# Patient Record
Sex: FEMALE | Race: WHITE | HISPANIC OR LATINO
[De-identification: names, ages, dates, MRNs, and addresses within clinical notes are randomized per-mention and may not be internally consistent; named-entity substitution may affect disease eponyms.]

---

## 2020-10-30 ENCOUNTER — APPOINTMENT (OUTPATIENT)
Dept: OTOLARYNGOLOGY | Facility: CLINIC | Age: 55
End: 2020-10-30
Payer: COMMERCIAL

## 2020-10-30 VITALS
DIASTOLIC BLOOD PRESSURE: 80 MMHG | BODY MASS INDEX: 23.22 KG/M2 | HEART RATE: 68 BPM | SYSTOLIC BLOOD PRESSURE: 127 MMHG | WEIGHT: 155 LBS | HEIGHT: 68.5 IN

## 2020-10-30 DIAGNOSIS — Z78.9 OTHER SPECIFIED HEALTH STATUS: ICD-10-CM

## 2020-10-30 DIAGNOSIS — H91.92 UNSPECIFIED HEARING LOSS, LEFT EAR: ICD-10-CM

## 2020-10-30 DIAGNOSIS — Z87.891 PERSONAL HISTORY OF NICOTINE DEPENDENCE: ICD-10-CM

## 2020-10-30 DIAGNOSIS — R09.82 POSTNASAL DRIP: ICD-10-CM

## 2020-10-30 DIAGNOSIS — J34.89 OTHER SPECIFIED DISORDERS OF NOSE AND NASAL SINUSES: ICD-10-CM

## 2020-10-30 DIAGNOSIS — Z82.2 FAMILY HISTORY OF DEAFNESS AND HEARING LOSS: ICD-10-CM

## 2020-10-30 DIAGNOSIS — Z82.49 FAMILY HISTORY OF ISCHEMIC HEART DISEASE AND OTHER DISEASES OF THE CIRCULATORY SYSTEM: ICD-10-CM

## 2020-10-30 DIAGNOSIS — Z80.9 FAMILY HISTORY OF MALIGNANT NEOPLASM, UNSPECIFIED: ICD-10-CM

## 2020-10-30 DIAGNOSIS — H93.12 TINNITUS, LEFT EAR: ICD-10-CM

## 2020-10-30 DIAGNOSIS — H92.02 OTALGIA, LEFT EAR: ICD-10-CM

## 2020-10-30 DIAGNOSIS — J34.2 DEVIATED NASAL SEPTUM: ICD-10-CM

## 2020-10-30 DIAGNOSIS — Z83.3 FAMILY HISTORY OF DIABETES MELLITUS: ICD-10-CM

## 2020-10-30 PROCEDURE — 92557 COMPREHENSIVE HEARING TEST: CPT

## 2020-10-30 PROCEDURE — 92567 TYMPANOMETRY: CPT

## 2020-10-30 PROCEDURE — 99204 OFFICE O/P NEW MOD 45 MIN: CPT | Mod: 25

## 2020-10-30 PROCEDURE — 31231 NASAL ENDOSCOPY DX: CPT

## 2020-10-30 PROCEDURE — 99072 ADDL SUPL MATRL&STAF TM PHE: CPT

## 2020-10-30 RX ORDER — OMEPRAZOLE MAGNESIUM 40 MG/1
CAPSULE, DELAYED RELEASE ORAL
Refills: 0 | Status: ACTIVE | COMMUNITY

## 2020-10-30 NOTE — REASON FOR VISIT
[Initial Evaluation] : an initial evaluation for [FreeTextEntry2] : here for sinus pain, sinus pressure, post nasal drip, left otalgia

## 2020-10-30 NOTE — HISTORY OF PRESENT ILLNESS
[de-identified] : 55 year old female here for sinus pain, sinus pressure, post nasal drip, left otalgia.  States all symptoms are left sided, started about 3 months ago.  States feels a heaviness, sinus congestion, fluid on the left side of her head.  Denies use of steroidal nasal sprays.  States usually has 1-2 sinus infections a year, treated with antibiotics.  Denies cat scans of sinuses in the past year.

## 2020-10-30 NOTE — REVIEW OF SYSTEMS
[Sneezing] : sneezing [Seasonal Allergies] : seasonal allergies [Post Nasal Drip] : post nasal drip [Ear Pain] : ear pain [Dizziness] : dizziness [Vertigo] : vertigo [Lightheadedness] : lightheadedness [Ear Noises] : ear noises [Recurrent Sinus Infections] : recurrent sinus infections [Sinus Pain] : sinus pain [Sinus Pressure] : sinus pressure [Throat Clearing] : throat clearing [Throat Pain] : throat pain [Throat Dryness] : throat dryness [Throat Itching] : throat itching [Wheezing] : wheezing [Cough] : cough [Heartburn] : heartburn [Joint Pain] : joint pain [Easy Bruising] : a tendency for easy bruising [Negative] : Endocrine [FreeTextEntry9] : muscle aches [FreeTextEntry1] : headache, sweating at night

## 2020-10-30 NOTE — PROCEDURE
[Video Captured] : video captured and filed [Image(s) Captured] : image(s) captured and filed [Unable to Cooperate with Mirror] : patient unable to cooperate with mirror [Complicated Symptoms] : complicated symptoms requiring more thorough examination than provided by mirror [Globus] : globus [Topical Lidocaine] : topical lidocaine [Oxymetazoline HCl] : oxymetazoline HCl [Flexible Endoscope] : examined with the flexible endoscope [Serial Number: ___] : Serial Number: [unfilled] [True Vocal Cords Paralysis] : no true vocal cord paralysis [True Vocal Cords Erythematous] : no true vocal cord edema [True Vocal Cords Aquino's Nodules] : no true vocal cord nodules [Glottis Arytenoid Cartilages] : no arytenoid granulomas [Glottis Arytenoid Cartilages Erythema] : no arytenoid erythema [Normal] : posterior cricoid area had healthy pink mucosa in the interarytenoid area and the esophageal inlet [Arytenoid Edema ___ /4] : bilaterally were normal [Arytenoid Erythema ___ /4] : bilaterally were normal [de-identified] : Patient was placed in the examination chair in a sitting position. The nose was decongested with oxymetazoline nasal solution. The airway was anesthetized with 4% Xylocaine.  The back of the throat was anesthetized with Cetacaine. Direct flexible/rigid video endoscopy was performed. Findings revealed:\par Patient has a large septal perforation the posterior bony septum is deviated to the right knee against the right lateral nasal wall left middle turbinate appears to be enlarged blocking portion of the drainage pathway and airway. Compensatory turbinate hypertrophy nasal pharynx clear larynx vocal cords epiglottis normal

## 2020-10-30 NOTE — PHYSICAL EXAM
[] : septum deviated to the right [Midline] : trachea located in midline position [Normal] : no rashes [de-identified] : middle turbinate on left obstructing, Large Septal Perforation.

## 2020-11-30 ENCOUNTER — OUTPATIENT (OUTPATIENT)
Dept: OUTPATIENT SERVICES | Facility: HOSPITAL | Age: 55
LOS: 1 days | End: 2020-11-30
Payer: COMMERCIAL

## 2020-11-30 ENCOUNTER — TRANSCRIPTION ENCOUNTER (OUTPATIENT)
Age: 55
End: 2020-11-30

## 2020-11-30 ENCOUNTER — APPOINTMENT (OUTPATIENT)
Dept: CT IMAGING | Facility: HOSPITAL | Age: 55
End: 2020-11-30

## 2020-11-30 DIAGNOSIS — J34.89 OTHER SPECIFIED DISORDERS OF NOSE AND NASAL SINUSES: ICD-10-CM

## 2020-11-30 DIAGNOSIS — J34.2 DEVIATED NASAL SEPTUM: ICD-10-CM

## 2020-11-30 DIAGNOSIS — Z00.00 ENCOUNTER FOR GENERAL ADULT MEDICAL EXAMINATION WITHOUT ABNORMAL FINDINGS: ICD-10-CM

## 2020-11-30 PROCEDURE — 70486 CT MAXILLOFACIAL W/O DYE: CPT

## 2020-11-30 PROCEDURE — 70486 CT MAXILLOFACIAL W/O DYE: CPT | Mod: 26

## 2020-12-08 ENCOUNTER — NON-APPOINTMENT (OUTPATIENT)
Age: 55
End: 2020-12-08

## 2020-12-09 DIAGNOSIS — J34.2 DEVIATED NASAL SEPTUM: ICD-10-CM

## 2020-12-11 RX ORDER — EUCALYPTUS/PEPPERMINT OIL
SOLUTION, NON-ORAL NASAL
Qty: 1 | Refills: 3 | Status: ACTIVE | COMMUNITY
Start: 2020-12-11 | End: 1900-01-01

## 2020-12-11 RX ORDER — MOMETASONE 50 UG/1
50 SPRAY, METERED NASAL DAILY
Qty: 1 | Refills: 5 | Status: ACTIVE | COMMUNITY
Start: 2020-12-11 | End: 1900-01-01

## 2022-03-01 ENCOUNTER — APPOINTMENT (OUTPATIENT)
Dept: ENDOCRINOLOGY | Facility: CLINIC | Age: 57
End: 2022-03-01
Payer: COMMERCIAL

## 2022-03-01 VITALS
TEMPERATURE: 97.5 F | DIASTOLIC BLOOD PRESSURE: 80 MMHG | SYSTOLIC BLOOD PRESSURE: 110 MMHG | OXYGEN SATURATION: 98 % | HEART RATE: 70 BPM | BODY MASS INDEX: 23.67 KG/M2 | HEIGHT: 68.5 IN | WEIGHT: 158 LBS

## 2022-03-01 DIAGNOSIS — Z86.39 PERSONAL HISTORY OF OTHER ENDOCRINE, NUTRITIONAL AND METABOLIC DISEASE: ICD-10-CM

## 2022-03-01 PROCEDURE — 99203 OFFICE O/P NEW LOW 30 MIN: CPT

## 2022-03-01 NOTE — ASSESSMENT
[FreeTextEntry1] : 57 year old female with history of multiple thyroid nodules here for evaluation . \par \par Thyroid nodules: \par -Sub-cm nodules, not meeting FNA critieria ( Low suspicion) \par -Will check TFTs \par -Follow up appropriate in 18 months

## 2022-03-01 NOTE — HISTORY OF PRESENT ILLNESS
[FreeTextEntry1] : 57 year old female here for eval of thyroid nodules. \par \par First discovered 2015, when it was bothering the patient. \par It was not being followed by the patient until 2021. \par She has been having some discomfort. She reported having pressure and increased dry cough. \par \par Most recent ultrasound showing 6 x 5 x 4 mm nodule on the left midpole ( Calcified nodule) \par 2mm colloid cyst in the right mid pole \par \par No family history of thyroid cancer \par No history of head or neck radiation exposure \par \par Some difficulty swallowing and occasional hoarseness. \par No choking sensation.                                                \par \par \par Father with hsitory of esophageal cancer, brother with leukemia and hodgkins lymphoma

## 2022-03-01 NOTE — PHYSICAL EXAM
[Alert] : alert [Well Nourished] : well nourished [No Acute Distress] : no acute distress [Normal Sclera/Conjunctiva] : normal sclera/conjunctiva [EOMI] : extra ocular movement intact [PERRL] : pupils equal, round and reactive to light [Normal Outer Ear/Nose] : the ears and nose were normal in appearance [Normal Hearing] : hearing was normal [Normal TMs] : both tympanic membranes were normal [No Neck Mass] : no neck mass was observed [Thyroid Not Enlarged] : the thyroid was not enlarged [No Respiratory Distress] : no respiratory distress [Clear to Auscultation] : lungs were clear to auscultation bilaterally [Normal S1, S2] : normal S1 and S2 [Normal Rate] : heart rate was normal [Regular Rhythm] : with a regular rhythm [Normal Bowel Sounds] : normal bowel sounds [Soft] : abdomen soft [Normal Gait] : normal gait [No Clubbing, Cyanosis] : no clubbing  or cyanosis of the fingernails [No Joint Swelling] : no joint swelling seen [Normal Strength/Tone] : muscle strength and tone were normal [No Rash] : no rash [No Skin Lesions] : no skin lesions [No Motor Deficits] : the motor exam was normal [Normal Reflexes] : deep tendon reflexes were 2+ and symmetric [No Tremors] : no tremors [Oriented x3] : oriented to person, place, and time [Normal Affect] : the affect was normal [Normal Insight/Judgement] : insight and judgment were intact [Normal Mood] : the mood was normal

## 2023-03-24 ENCOUNTER — APPOINTMENT (OUTPATIENT)
Dept: ENDOCRINOLOGY | Facility: CLINIC | Age: 58
End: 2023-03-24

## 2023-04-13 ENCOUNTER — OFFICE (OUTPATIENT)
Facility: LOCATION | Age: 58
Setting detail: OPHTHALMOLOGY
End: 2023-04-13
Payer: COMMERCIAL

## 2023-04-13 VITALS — WEIGHT: 150 LBS | BODY MASS INDEX: 22.73 KG/M2 | HEIGHT: 68 IN

## 2023-04-13 DIAGNOSIS — H40.013: ICD-10-CM

## 2023-04-13 DIAGNOSIS — H43.812: ICD-10-CM

## 2023-04-13 DIAGNOSIS — G43.109: ICD-10-CM

## 2023-04-13 DIAGNOSIS — H25.13: ICD-10-CM

## 2023-04-13 PROCEDURE — 92202 OPSCPY EXTND ON/MAC DRAW: CPT | Performed by: OPHTHALMOLOGY

## 2023-04-13 PROCEDURE — 92133 CPTRZD OPH DX IMG PST SGM ON: CPT | Performed by: OPHTHALMOLOGY

## 2023-04-13 PROCEDURE — 92020 GONIOSCOPY: CPT | Performed by: OPHTHALMOLOGY

## 2023-04-13 PROCEDURE — 92004 COMPRE OPH EXAM NEW PT 1/>: CPT | Performed by: OPHTHALMOLOGY

## 2023-04-13 PROCEDURE — 76514 ECHO EXAM OF EYE THICKNESS: CPT | Performed by: OPHTHALMOLOGY

## 2023-04-13 PROCEDURE — 92083 EXTENDED VISUAL FIELD XM: CPT | Performed by: OPHTHALMOLOGY

## 2023-04-13 ASSESSMENT — REFRACTION_AUTOREFRACTION
OS_SPHERE: +0.25
OD_AXIS: 090
OD_SPHERE: +1.00
OS_CYLINDER: -0.25
OD_CYLINDER: -0.50
OS_AXIS: 57

## 2023-04-13 ASSESSMENT — AXIALLENGTH_DERIVED
OD_AL: 22.7351
OS_AL: 22.9201

## 2023-04-13 ASSESSMENT — PACHYMETRY
OD_CT_UM: 535
OS_CT_CORRECTION: 0
OS_CT_UM: 548
OD_CT_CORRECTION: 1

## 2023-04-13 ASSESSMENT — KERATOMETRY
OS_K1POWER_DIOPTERS: 45.00
OD_AXISANGLE_DEGREES: 03
OS_K2POWER_DIOPTERS: 45.50
OD_K2POWER_DIOPTERS: 45.25
OD_K1POWER_DIOPTERS: 45.00
OS_AXISANGLE_DEGREES: 156

## 2023-04-13 ASSESSMENT — TONOMETRY
OD_IOP_MMHG: 10
OS_IOP_MMHG: 10

## 2023-04-13 ASSESSMENT — VISUAL ACUITY
OD_BCVA: 20/40
OS_BCVA: 20/40

## 2023-04-13 ASSESSMENT — SPHEQUIV_DERIVED
OD_SPHEQUIV: 0.75
OS_SPHEQUIV: 0.125

## 2023-05-25 ENCOUNTER — APPOINTMENT (OUTPATIENT)
Dept: PAIN MANAGEMENT | Facility: CLINIC | Age: 58
End: 2023-05-25
Payer: COMMERCIAL

## 2023-05-25 VITALS
HEART RATE: 67 BPM | HEIGHT: 68.5 IN | WEIGHT: 158 LBS | BODY MASS INDEX: 23.67 KG/M2 | DIASTOLIC BLOOD PRESSURE: 80 MMHG | SYSTOLIC BLOOD PRESSURE: 145 MMHG

## 2023-05-25 PROCEDURE — 99204 OFFICE O/P NEW MOD 45 MIN: CPT

## 2023-05-29 ENCOUNTER — RESULT REVIEW (OUTPATIENT)
Age: 58
End: 2023-05-29

## 2023-05-31 NOTE — PHYSICAL EXAM
[General Appearance - Alert] : alert [General Appearance - Well Developed] : well developed [Impaired Insight] : insight and judgment were intact [Affect] : the affect was normal [Memory Recent] : recent memory was not impaired [Memory Remote] : remote memory was not impaired [Person] : oriented to person [Place] : oriented to place [Time] : oriented to time [Short Term Intact] : short term memory intact [Remote Intact] : remote memory intact [Registration Intact] : recent registration memory intact [Concentration Intact] : normal concentrating ability [Visual Intact] : visual attention was ~T not ~L decreased [Writing A Sentence] : no difficulty writing a sentence [Fluency] : fluency intact [Comprehension] : comprehension intact [Reading] : reading intact [Current Events] : adequate knowledge of current events [Past History] : adequate knowledge of personal past history [Vocabulary] : adequate range of vocabulary [Cranial Nerves Oculomotor (III)] : extraocular motion intact [Cranial Nerves Facial (VII)] : face symmetrical [Cranial Nerves Vestibulocochlear (VIII)] : hearing was intact bilaterally [Cranial Nerves Accessory (XI - Cranial And Spinal)] : head turning and shoulder shrug symmetric [Cranial Nerves Hypoglossal (XII)] : there was no tongue deviation with protrusion [No Muscle Atrophy] : normal bulk in all four extremities [Motor Strength Upper Extremities Bilaterally] : strength was normal in both upper extremities [Sensation Tactile Decrease] : light touch was intact [Allodynia] : no ~T allodynia present [Tremor] : no tremor present [Dysdiadochokinesia Bilaterally] : not present [Sclera] : the sclera and conjunctiva were normal [No KEENAN] : no internuclear ophthalmoplegia [Strabismus] : no strabismus was seen [Outer Ear] : the ears and nose were normal in appearance [Neck Appearance] : the appearance of the neck was normal [Neck Cervical Mass (___cm)] : no neck mass was observed [] : no respiratory distress [Exaggerated Use Of Accessory Muscles For Inspiration] : no accessory muscle use [Abnormal Walk] : normal gait [Nail Clubbing] : no clubbing  or cyanosis of the fingernails [Involuntary Movements] : no involuntary movements were seen

## 2023-05-31 NOTE — REVIEW OF SYSTEMS
[Fever] : no fever [Chills] : no chills [Feeling Poorly] : feeling poorly [Eye Pain] : eye pain [Eyesight Problems] : no eyesight problems [Nasal Discharge] : no nasal discharge [Chest Pain] : no chest pain [Cough] : no cough [As Noted in HPI] : as noted in HPI [Arthralgias] : arthralgias [Skin Lesions] : no skin lesions [Itching] : no itching [Convulsions] : no convulsions [Fainting] : no fainting [Sleep Disturbances] : sleep disturbances [Muscle Weakness] : no muscle weakness

## 2023-05-31 NOTE — ASSESSMENT
[FreeTextEntry1] : Will plan for MRI with MRA/ MRV given ? issues with Papilledema.\par consider for migraine management.\par Referral to [facility] and [facility] sites.\par

## 2023-05-31 NOTE — HISTORY OF PRESENT ILLNESS
[Headache] : headache [Nausea] : nausea [Photophobia] : photophobia [Phonophobia] : phonophobia [Neck Pain] : neck pain [Scalp Tenderness] : scalp tenderness [FreeTextEntry1] : Pt notes that she had seen optometrist recently who had then referred her to an ophthalmologist in [facility].\par Noted that her optic nerve was "large.:" However, note from opthamology suggests only risk of glaucoma.  No evidence of papilledema.\par Does note that she has a history of migraine and that has gotten more severe.\par Does have events where she gets nausea, light sensitivity and noise sensitivity.\par Since this had started she is now getting headaches on a daily basis.  Finds glare is trigger, cloudy days, watching tv or in front of screen for work will get worse.\par Will sometimes have to watch movie "just by sound."\par Can feel worse in hot car as well."\par Will sometimes take 2 advil but can be difficult because of the nausea.\par Does get some visual changes in periphery of vision." and some visual flashes.\par Does get tinnitus but also can be like a heartbeat- moreso on the left.\par Since Feb has found that these events have been worsening.\par Had been taking low dose of phentermine in Dec (taking small dose), takes neutrophil, \par Hx of fibroid s/p surgery and has had hysterectomy and single oophorectomy.\par has had a mesh placed for hernia on the left.\par Did have event with fainting and convulsing as child and r/o everything but migraine.  Was given Bellergal S.  Came back in 30s and 40s and was given as needed medication.\par Last event of migraine she had been to gynecologist (who she hadn't seen in 3 days.)  He recommended

## 2023-06-05 ENCOUNTER — OUTPATIENT (OUTPATIENT)
Dept: OUTPATIENT SERVICES | Facility: HOSPITAL | Age: 58
LOS: 1 days | End: 2023-06-05
Payer: COMMERCIAL

## 2023-06-05 ENCOUNTER — APPOINTMENT (OUTPATIENT)
Dept: MRI IMAGING | Facility: CLINIC | Age: 58
End: 2023-06-05
Payer: COMMERCIAL

## 2023-06-05 DIAGNOSIS — Z00.8 ENCOUNTER FOR OTHER GENERAL EXAMINATION: ICD-10-CM

## 2023-06-05 DIAGNOSIS — G44.52 NEW DAILY PERSISTENT HEADACHE (NDPH): ICD-10-CM

## 2023-06-05 PROCEDURE — 70551 MRI BRAIN STEM W/O DYE: CPT

## 2023-06-05 PROCEDURE — 70544 MR ANGIOGRAPHY HEAD W/O DYE: CPT | Mod: 26,59

## 2023-06-05 PROCEDURE — 70544 MR ANGIOGRAPHY HEAD W/O DYE: CPT

## 2023-06-05 PROCEDURE — 70551 MRI BRAIN STEM W/O DYE: CPT | Mod: 26

## 2023-07-11 ENCOUNTER — APPOINTMENT (OUTPATIENT)
Dept: ORTHOPEDIC SURGERY | Facility: CLINIC | Age: 58
End: 2023-07-11
Payer: COMMERCIAL

## 2023-07-11 VITALS — BODY MASS INDEX: 23.67 KG/M2 | HEIGHT: 68.5 IN | WEIGHT: 158 LBS

## 2023-07-11 DIAGNOSIS — M23.91 UNSPECIFIED INTERNAL DERANGEMENT OF RIGHT KNEE: ICD-10-CM

## 2023-07-11 PROCEDURE — 73564 X-RAY EXAM KNEE 4 OR MORE: CPT | Mod: RT

## 2023-07-11 PROCEDURE — 99203 OFFICE O/P NEW LOW 30 MIN: CPT

## 2023-07-11 PROCEDURE — 99213 OFFICE O/P EST LOW 20 MIN: CPT

## 2023-07-11 NOTE — HISTORY OF PRESENT ILLNESS
[Gradual] : gradual [9] : 9 [3] : 3 [Constant] : constant [Leisure] : leisure [Meds] : meds [Ice] : ice [Standing] : standing [Walking] : walking [Stairs] : stairs [de-identified] : 07/11/2023 Ms. SHAUN HART, a 58 year old female, presents today for right knee pain started July 1st with increased activity including running. Reports that she has been experiencing instability of the right knee.  [] : no [FreeTextEntry1] : Rt knee  [FreeTextEntry5] : Patient is here for the RT knee. No prior injury. Patient went from the sink to the stove and turn the knee the wrong way on 07/01/23. No N/T. ROM limited. Knee is swollen. \par H/O left knee surgery  [FreeTextEntry7] : Foot  [FreeTextEntry9] : Advil

## 2023-07-11 NOTE — PHYSICAL EXAM
[Right] : right knee [NL (0)] : extension 0 degrees [Positive] : positive Carin [] : antalgic [TWNoteComboBox7] : flexion 120 degrees

## 2023-07-11 NOTE — DISCUSSION/SUMMARY
[de-identified] : 58F with medial right knee pain and instability. +mcmurrays s/s of meniscal tear\par 1) MRI of right knee eval meniscal tear\par 2) cryotherapy, rest and activity modification\par 3) rtc after MRI\par \par Entered by Tessa Swenson acting as scribe.\par Dr. Priest-\par The documentation recorded by the scribe accurately reflects the service I personally performed and the decisions made by me.\par

## 2023-07-16 ENCOUNTER — FORM ENCOUNTER (OUTPATIENT)
Age: 58
End: 2023-07-16

## 2023-07-17 ENCOUNTER — APPOINTMENT (OUTPATIENT)
Dept: MRI IMAGING | Facility: CLINIC | Age: 58
End: 2023-07-17
Payer: COMMERCIAL

## 2023-07-17 PROCEDURE — 73721 MRI JNT OF LWR EXTRE W/O DYE: CPT | Mod: RT

## 2023-07-20 ENCOUNTER — APPOINTMENT (OUTPATIENT)
Dept: ORTHOPEDIC SURGERY | Facility: CLINIC | Age: 58
End: 2023-07-20
Payer: COMMERCIAL

## 2023-07-20 VITALS — BODY MASS INDEX: 23.67 KG/M2 | HEIGHT: 68.5 IN | WEIGHT: 158 LBS

## 2023-07-20 DIAGNOSIS — S83.241A OTHER TEAR OF MEDIAL MENISCUS, CURRENT INJURY, RIGHT KNEE, INITIAL ENCOUNTER: ICD-10-CM

## 2023-07-20 DIAGNOSIS — M17.11 UNILATERAL PRIMARY OSTEOARTHRITIS, RIGHT KNEE: ICD-10-CM

## 2023-07-20 PROCEDURE — 99214 OFFICE O/P EST MOD 30 MIN: CPT

## 2023-07-20 NOTE — PHYSICAL EXAM
[Right] : right knee [NL (0)] : extension 0 degrees [Positive] : positive Carin [] : no pain with varus stress [TWNoteComboBox7] : flexion 120 degrees

## 2023-07-20 NOTE — DISCUSSION/SUMMARY
[de-identified] : 58f with right knee djd and mmt on MRI.  Minimal remaining pain complaints\par 1) discussed the availability of csi if pain returns\par 2) home exercises provided\par 3) cryotherapy, rest and activity modification\par 4) rtc prn \par \par Entered by Tessa Swenson acting as scribe.\par

## 2023-07-20 NOTE — DATA REVIEWED
[MRI] : MRI [Right] : of the right [Knee] : knee [Report was reviewed and noted in the chart] : The report was reviewed and noted in the chart [I independently reviewed and interpreted images and report] : I independently reviewed and interpreted images and report [I reviewed the films/CD] : I reviewed the films/CD [FreeTextEntry1] : 07.17.23\par 1. Free edge and undersurface tearing of the posterior horn and body of the medial meniscus with mild diffuse surrounding synovitis.\par 2. Medial and patellofemoral compartment arthrosis with slight subchondral edema in the superior medial \par patella.\par 3. Mild-to-moderate effusion, synovitis, and multiple plica with small popliteal cyst and mild pes anserine \par tenosynovitis/bursitis.\par 4. Mild ACL sprain and mild MCL sprain.\par 5. Mild extensor mechanism tendinopathy.\par 6. No acute osseous injury, loose body, or lateral, meniscal tear.

## 2023-07-20 NOTE — HISTORY OF PRESENT ILLNESS
[1] : 2 [0] : 0 [de-identified] : 7/20/23: Here to f/up right knee and review MRI results. \par 07/11/2023 Ms. SHAUN HART, a 58 year old female, presents today for right knee pain started July 1st with increased activity including running. Reports that she has been experiencing instability of the right knee.  [FreeTextEntry1] : Rt Knee [FreeTextEntry5] : MRI Results: Rt Knee. Pt pain Improving. Pt using brace occasionally. ROM Improving. Slight swelling in the Rt Knee.

## 2024-02-07 ENCOUNTER — APPOINTMENT (OUTPATIENT)
Dept: PAIN MANAGEMENT | Facility: CLINIC | Age: 59
End: 2024-02-07
Payer: COMMERCIAL

## 2024-02-07 VITALS
SYSTOLIC BLOOD PRESSURE: 111 MMHG | BODY MASS INDEX: 23.67 KG/M2 | HEART RATE: 59 BPM | WEIGHT: 158 LBS | HEIGHT: 68.5 IN | DIASTOLIC BLOOD PRESSURE: 72 MMHG

## 2024-02-07 DIAGNOSIS — G44.52 NEW DAILY PERSISTENT HEADACHE (NDPH): ICD-10-CM

## 2024-02-07 DIAGNOSIS — G43.709 CHRONIC MIGRAINE W/OUT AURA, NOT INTRACTABLE, W/OUT STATUS MIGRAINOSUS: ICD-10-CM

## 2024-02-07 PROCEDURE — 99214 OFFICE O/P EST MOD 30 MIN: CPT

## 2024-02-07 RX ORDER — NARATRIPTAN 2.5 MG/1
2.5 TABLET, FILM COATED ORAL
Qty: 2 | Refills: 2 | Status: ACTIVE | COMMUNITY
Start: 2024-02-07 | End: 1900-01-01

## 2024-02-21 PROBLEM — G44.52 NEW DAILY PERSISTENT HEADACHE: Status: ACTIVE | Noted: 2023-05-25

## 2024-02-21 PROBLEM — G43.709 CHRONIC MIGRAINE WITHOUT AURA: Status: ACTIVE | Noted: 2023-05-25

## 2024-02-21 NOTE — PHYSICAL EXAM
[General Appearance - Alert] : alert [General Appearance - Well Developed] : well developed [Impaired Insight] : insight and judgment were intact [Affect] : the affect was normal [Memory Recent] : recent memory was not impaired [Memory Remote] : remote memory was not impaired [Person] : oriented to person [Place] : oriented to place [Time] : oriented to time [Short Term Intact] : short term memory intact [Remote Intact] : remote memory intact [Registration Intact] : recent registration memory intact [Concentration Intact] : normal concentrating ability [Visual Intact] : visual attention was ~T not ~L decreased [Writing A Sentence] : no difficulty writing a sentence [Fluency] : fluency intact [Comprehension] : comprehension intact [Reading] : reading intact [Current Events] : adequate knowledge of current events [Past History] : adequate knowledge of personal past history [Vocabulary] : adequate range of vocabulary [Cranial Nerves Oculomotor (III)] : extraocular motion intact [Cranial Nerves Facial (VII)] : face symmetrical [Cranial Nerves Vestibulocochlear (VIII)] : hearing was intact bilaterally [Cranial Nerves Accessory (XI - Cranial And Spinal)] : head turning and shoulder shrug symmetric [Cranial Nerves Hypoglossal (XII)] : there was no tongue deviation with protrusion [No Muscle Atrophy] : normal bulk in all four extremities [Sensation Tactile Decrease] : light touch was intact [Sclera] : the sclera and conjunctiva were normal [No KEENAN] : no internuclear ophthalmoplegia [Strabismus] : no strabismus was seen [Outer Ear] : the ears and nose were normal in appearance [Neck Appearance] : the appearance of the neck was normal [Neck Cervical Mass (___cm)] : no neck mass was observed [Exaggerated Use Of Accessory Muscles For Inspiration] : no accessory muscle use [Abnormal Walk] : normal gait [Nail Clubbing] : no clubbing  or cyanosis of the fingernails [Involuntary Movements] : no involuntary movements were seen [Motor Strength Upper Extremities Bilaterally] : strength was normal in both upper extremities [Allodynia] : no ~T allodynia present [Tremor] : no tremor present [Dysdiadochokinesia Bilaterally] : not present [Skin Color & Pigmentation] : normal skin color and pigmentation [] : no rash

## 2024-02-21 NOTE — REVIEW OF SYSTEMS
[Fever] : no fever [Feeling Poorly] : feeling poorly [As Noted in HPI] : as noted in HPI [Eye Pain] : eye pain [Eyesight Problems] : eyesight problems [Nasal Discharge] : no nasal discharge [Chest Pain] : no chest pain [Shortness Of Breath] : no shortness of breath [Cough] : no cough [Arthralgias] : arthralgias [Skin Lesions] : no skin lesions [Skin Wound] : no skin wound [Itching] : no itching [Confused] : no confusion [Convulsions] : no convulsions [Dizziness] : no dizziness [Fainting] : no fainting [Sleep Disturbances] : sleep disturbances [Muscle Weakness] : no muscle weakness

## 2024-02-21 NOTE — ASSESSMENT
[FreeTextEntry1] : Would give trial of naratriptan 2.5mg Discussed vitamin prevention and other non pharmacologic care. Would consider for prevention with use of amitriptyline vs venlafaxine at next OV.

## 2024-02-21 NOTE — HISTORY OF PRESENT ILLNESS
[FreeTextEntry1] : Pt notes that since last visit she had the imaging done with us which had looked benign. had been back to ophthalmologist and referred to retina specialist for further testing which also "didn't require further imaging." Was given sumatriptan by MD since the last time she came in and found it was effective but had recurrence once.  Has found she still gets fairly frequent events and thinks that her eyes have been a trigger for her. No other new focal deficits.  No change in quality or frequency of events. Still limited with acute care when headache worsens. [Chronic Headache] : chronic headache [Nausea] : nausea [Photophobia] : photophobia [Phonophobia] : phonophobia [> 15 days per month] : > 15 days per month [Stable] : The patient reports ~his/her~ symptoms since the last visit are stable

## 2024-02-27 ENCOUNTER — OFFICE (OUTPATIENT)
Facility: LOCATION | Age: 59
Setting detail: OPHTHALMOLOGY
End: 2024-02-27
Payer: COMMERCIAL

## 2024-02-27 DIAGNOSIS — H40.013: ICD-10-CM

## 2024-02-27 DIAGNOSIS — H43.812: ICD-10-CM

## 2024-02-27 DIAGNOSIS — H25.13: ICD-10-CM

## 2024-02-27 DIAGNOSIS — G43.109: ICD-10-CM

## 2024-02-27 PROCEDURE — 92133 CPTRZD OPH DX IMG PST SGM ON: CPT | Performed by: OPHTHALMOLOGY

## 2024-02-27 PROCEDURE — 92083 EXTENDED VISUAL FIELD XM: CPT | Performed by: OPHTHALMOLOGY

## 2024-02-27 PROCEDURE — 99214 OFFICE O/P EST MOD 30 MIN: CPT | Performed by: OPHTHALMOLOGY

## 2024-02-27 ASSESSMENT — REFRACTION_AUTOREFRACTION
OS_CYLINDER: -0.50
OS_AXIS: 070
OS_SPHERE: +0.75
OD_SPHERE: +1.00
OD_CYLINDER: -0.25
OD_AXIS: 109

## 2024-02-27 ASSESSMENT — SPHEQUIV_DERIVED
OS_SPHEQUIV: 0.5
OD_SPHEQUIV: 0.875

## 2024-02-27 ASSESSMENT — CONFRONTATIONAL VISUAL FIELD TEST (CVF)
OD_FINDINGS: FULL
OS_FINDINGS: FULL

## 2024-03-11 ENCOUNTER — APPOINTMENT (OUTPATIENT)
Dept: ORTHOPEDIC SURGERY | Facility: CLINIC | Age: 59
End: 2024-03-11
Payer: COMMERCIAL

## 2024-03-11 VITALS — WEIGHT: 158 LBS | HEIGHT: 68.5 IN | BODY MASS INDEX: 23.67 KG/M2

## 2024-03-11 PROCEDURE — 72200 X-RAY EXAM SI JOINTS: CPT

## 2024-03-11 PROCEDURE — 72170 X-RAY EXAM OF PELVIS: CPT

## 2024-03-11 PROCEDURE — 72110 X-RAY EXAM L-2 SPINE 4/>VWS: CPT

## 2024-03-11 PROCEDURE — 99213 OFFICE O/P EST LOW 20 MIN: CPT

## 2024-03-11 NOTE — ASSESSMENT
[FreeTextEntry1] : 60 y/o F with acute low back pain w/o radiculopathy. Xr's w/o fx, instabilities, collapses or deformities. On physical exam no weakness in LE. Pain has been severe and interferes with her ADL's. Wakes her up at night for the past 2 weeks.   - Ordered [facility] sacrum MRI  - F/up after MRI.

## 2024-03-11 NOTE — HISTORY OF PRESENT ILLNESS
[Lower back] : lower back [8] : 8 [Burning] : burning [Dull/Aching] : dull/aching [Radiating] : radiating [Constant] : constant [Sleep] : sleep [Nothing helps with pain getting better] : Nothing helps with pain getting better [de-identified] : 03/11/24: 58 yo F presenting with low back pain that started 2 weeks ago. Does not radiate. No known injury. Feels pain/burning to coccyx. Has pain in pelvic bone. Pain exacerbates when laying down. Wakes her up at night. Has hx of back pain, seen by Dr. Prabhakar,  usually alleviates with meds, states this episode is worse and is refractory to meds.  [] : no

## 2024-03-11 NOTE — IMAGING
[No bony abnormalities] : No bony abnormalities [Disc space narrowing] : Disc space narrowing [No instability seen on flexion/extension] : No instability seen on flexion/extension [No spinal deformity, fracture, lytic lesion, or marked single level collapse] : No spinal deformity, fracture, lytic lesion, or marked single level collapse [AP] : anteroposterior [There are no fractures, subluxations or dislocations. No significant abnormalities are seen] : There are no fractures, subluxations or dislocations. No significant abnormalities are seen [de-identified] : [facility] Inspection: No defects or deformities Palpation: No tenderness or spasms in b/l lumbar paraspinal musculature ROM: Full with stiffness.  Strength: 5/5 b/l hip flexors, knee extensors, ankle dorsiflexors, EHL, ankle plantarflexors Neuro: Sensation LT I Negative b/l SLR Toe and heal walk intact Gait non antalgic

## 2024-03-15 ENCOUNTER — APPOINTMENT (OUTPATIENT)
Dept: MRI IMAGING | Facility: CLINIC | Age: 59
End: 2024-03-15
Payer: COMMERCIAL

## 2024-03-15 ENCOUNTER — APPOINTMENT (OUTPATIENT)
Dept: MRI IMAGING | Facility: CLINIC | Age: 59
End: 2024-03-15

## 2024-03-15 PROCEDURE — 72195 MRI PELVIS W/O DYE: CPT

## 2024-03-15 PROCEDURE — 72148 MRI LUMBAR SPINE W/O DYE: CPT

## 2024-03-25 ENCOUNTER — APPOINTMENT (OUTPATIENT)
Dept: ORTHOPEDIC SURGERY | Facility: CLINIC | Age: 59
End: 2024-03-25
Payer: COMMERCIAL

## 2024-03-25 DIAGNOSIS — M51.26 OTHER INTERVERTEBRAL DISC DISPLACEMENT, LUMBAR REGION: ICD-10-CM

## 2024-03-25 PROCEDURE — 99213 OFFICE O/P EST LOW 20 MIN: CPT

## 2024-03-25 NOTE — HISTORY OF PRESENT ILLNESS
[Lower back] : lower back [8] : 8 [Burning] : burning [Dull/Aching] : dull/aching [Radiating] : radiating [Constant] : constant [Sleep] : sleep [Nothing helps with pain getting better] : Nothing helps with pain getting better [de-identified] : 3/25/24: Follow up [facility]. States symptoms have worsened since last visit. MRI review.   03/11/24: 60 yo F presenting with low back pain that started 2 weeks ago. Does not radiate. No known injury. Feels pain/burning to coccyx. Has pain in pelvic bone. Pain exacerbates when laying down. Wakes her up at night. Has hx of back pain, seen by Dr. Prabhakar,  usually alleviates with meds, states this episode is worse and is refractory to meds.  [] : no [FreeTextEntry5] : MRI REVIEW [facility]

## 2024-03-25 NOTE — IMAGING
[No bony abnormalities] : No bony abnormalities [Disc space narrowing] : Disc space narrowing [No instability seen on flexion/extension] : No instability seen on flexion/extension [No spinal deformity, fracture, lytic lesion, or marked single level collapse] : No spinal deformity, fracture, lytic lesion, or marked single level collapse [AP] : anteroposterior [There are no fractures, subluxations or dislocations. No significant abnormalities are seen] : There are no fractures, subluxations or dislocations. No significant abnormalities are seen [de-identified] : [facility] Inspection: No defects or deformities Palpation: No tenderness or spasms in b/l lumbar paraspinal musculature ROM: Full with stiffness.  Strength: 5/5 b/l hip flexors, knee extensors, ankle dorsiflexors, EHL, ankle plantarflexors Neuro: Sensation LT I Negative b/l SLR Toe and heal walk intact Gait non antalgic

## 2024-03-25 NOTE — ASSESSMENT
[FreeTextEntry1] : 58 y/o F with acute low back pain w/o radiculopathy. LS and sacrum MRI w/o lesions, fx's or significant nerve compression. Diffuse spondylosis and disc degeneration. Has underlying psoriasis, this goes in hand with diffuse back pain, so could be a source of her symptoms.   1. Referred to [facility] for non-surgical care, f/up after completing PT.   2. Start PT for lower back.  3. Ordered Rheum lab w/u, will call to go over results.

## 2024-03-25 NOTE — DATA REVIEWED
[MRI] : MRI [Lumbar Spine] : lumbar spine [I independently reviewed and interpreted images and report] : I independently reviewed and interpreted images and report [Report was reviewed and noted in the chart] : The report was reviewed and noted in the chart [FreeTextEntry1] : [facility] MRI 3/15/24 1. Multilevel disc degeneration and HNP's w/o significant nerve compression.  2. No acute fx or gross malalignment   Sacrum MRI 1. No acute fx or active sacroiliitis.

## 2024-04-01 RX ORDER — TIZANIDINE 4 MG/1
4 TABLET ORAL 3 TIMES DAILY
Qty: 21 | Refills: 0 | Status: ACTIVE | COMMUNITY
Start: 2024-04-01 | End: 1900-01-01

## 2024-06-05 ENCOUNTER — LABORATORY RESULT (OUTPATIENT)
Age: 59
End: 2024-06-05

## 2024-06-05 ENCOUNTER — APPOINTMENT (OUTPATIENT)
Dept: RHEUMATOLOGY | Facility: CLINIC | Age: 59
End: 2024-06-05
Payer: COMMERCIAL

## 2024-06-05 DIAGNOSIS — M53.3 SACROCOCCYGEAL DISORDERS, NOT ELSEWHERE CLASSIFIED: ICD-10-CM

## 2024-06-05 DIAGNOSIS — M25.50 PAIN IN UNSPECIFIED JOINT: ICD-10-CM

## 2024-06-05 DIAGNOSIS — L40.9 PSORIASIS, UNSPECIFIED: ICD-10-CM

## 2024-06-05 PROCEDURE — G2211 COMPLEX E/M VISIT ADD ON: CPT | Mod: NC

## 2024-06-05 PROCEDURE — 99204 OFFICE O/P NEW MOD 45 MIN: CPT

## 2024-06-07 PROBLEM — L40.9 PSORIASIS: Status: ACTIVE | Noted: 2024-06-05

## 2024-06-07 PROBLEM — M25.50 ARTHRALGIA OF MULTIPLE JOINTS: Status: ACTIVE | Noted: 2024-06-07

## 2024-06-07 NOTE — HISTORY OF PRESENT ILLNESS
[FreeTextEntry1] : 59F with OA of R. knee, hx of R. knee medial meniscus tear, L. ear hearing loss, chronic migraine, recently diagnosed psoriasis, here for evaluation of joint pain.   lower back/sacral pain started in 3/2024- however MRI showed no sacroiliac inflammation/other inflammation; she reports she was found to have disc herniations, but doesnt know how, denies preceding injuries.  but since  3 years ago, noted dactylitis in multiple fingers intermittent, and joint pain and stiffness in hands, with weakness in the hands- had a rheumatologic evaluation at the time but was not found to have RA. At the time did have mild psoriasis lesions but this was not diagnosed at this time.  Patient reports Raynauds as well, fingers turn purple in cold, resolve with warming.  She reports hx of dactylitis in b/l fingers and toes, but not currently. She reports chronic nail ridging (covered today by nail polish but slightly visible through it). no oral ulcers, no sicca symptoms, no weight loss.   Family history: reports her mother and grandmother both had RA  [Weight Loss] : no weight loss [Skin Lesions] : skin lesions [Skin Nodules] : no skin nodules [Oral Ulcers] : no oral ulcers [Dry Mouth] : no dry mouth [Arthralgias] : arthralgias [Morning Stiffness] : morning stiffness [Dry Eyes] : no dry eyes

## 2024-06-07 NOTE — PHYSICAL EXAM
[Musculoskeletal - Swelling] : no joint swelling seen [No Focal Deficits] : no focal deficits [Oriented To Time, Place, And Person] : oriented to person, place, and time [Affect] : the affect was normal [Mood] : the mood was normal [General Appearance - Alert] : alert [General Appearance - In No Acute Distress] : in no acute distress [General Appearance - Well Developed] : well developed [General Appearance - Well-Appearing] : healthy appearing [Sclera] : the sclera and conjunctiva were normal [Extraocular Movements] : extraocular movements were intact [Outer Ear] : the ears and nose were normal in appearance [] : no respiratory distress [Exaggerated Use Of Accessory Muscles For Inspiration] : no accessory muscle use [Edema] : there was no peripheral edema [FreeTextEntry1] : no psoriatis lesions noted on extremities

## 2024-06-07 NOTE — ASSESSMENT
[FreeTextEntry1] : 59F with recently diagnosed psoriasis on topical therapies, OA of R. knee, hx of R. knee medial meniscus tear, L. ear hearing loss, chronic migraine, here for evaluation for possible psoriatic arthritis.   will check labs for RA, SLE, Sjogrens, which can all cause inflammatory arthritis. if the above labs are unremarkable I would treat for presumed psoriatic arthritis given presence of psoriasis as well as reported hx of dactylitis and nail ridging. I do not feel her lower back pain is related to her inflammatory arthritis, as MRI did not reveal sacroilitis/other joint inflammation. It may be structural from herniated discs.  I would consider a DMARD first for PsA if all other tests come back negative. MTX was considered but will not be used as patient has up to 9 alcoholic drinks during the weekend- may consider SSZ or Otezla.

## 2024-06-24 DIAGNOSIS — L40.50 ARTHROPATHIC PSORIASIS, UNSPECIFIED: ICD-10-CM

## 2024-06-24 LAB
25(OH)D3 SERPL-MCNC: 45.8 NG/ML
ALBUMIN SERPL ELPH-MCNC: 4.5 G/DL
ALP BLD-CCNC: 81 U/L
ALT SERPL-CCNC: 29 U/L
ANA SER IF-ACNC: NEGATIVE
ANION GAP SERPL CALC-SCNC: 12 MMOL/L
APPEARANCE: CLEAR
AST SERPL-CCNC: 23 U/L
BASOPHILS # BLD AUTO: 0.06 K/UL
BASOPHILS NFR BLD AUTO: 0.9 %
BILIRUB SERPL-MCNC: 0.3 MG/DL
BILIRUBIN URINE: NEGATIVE
BLOOD URINE: NEGATIVE
BUN SERPL-MCNC: 16 MG/DL
C3 SERPL-MCNC: 107 MG/DL
C4 SERPL-MCNC: 33 MG/DL
CALCIUM SERPL-MCNC: 9.2 MG/DL
CCP AB SER IA-ACNC: <8 UNITS
CHLORIDE SERPL-SCNC: 106 MMOL/L
CK SERPL-CCNC: 53 U/L
CO2 SERPL-SCNC: 24 MMOL/L
COLOR: YELLOW
CREAT SERPL-MCNC: 0.89 MG/DL
CREAT SPEC-SCNC: 35 MG/DL
CREAT/PROT UR: 0.2 RATIO
CRP SERPL-MCNC: <3 MG/L
DSDNA AB SER-ACNC: <1 IU/ML
EGFR: 75 ML/MIN/1.73M2
ENA RNP AB SER IA-ACNC: 0.2 AL
ENA SM AB SER IA-ACNC: <0.2 AL
ENA SS-A AB SER IA-ACNC: <0.2 AL
ENA SS-B AB SER IA-ACNC: <0.2 AL
EOSINOPHIL # BLD AUTO: 0.08 K/UL
EOSINOPHIL NFR BLD AUTO: 1.2 %
ERYTHROCYTE [SEDIMENTATION RATE] IN BLOOD BY WESTERGREN METHOD: 11 MM/HR
GLUCOSE QUALITATIVE U: NEGATIVE MG/DL
GLUCOSE SERPL-MCNC: 101 MG/DL
HAV IGM SER QL: NONREACTIVE
HBV CORE IGG+IGM SER QL: NONREACTIVE
HBV CORE IGM SER QL: NONREACTIVE
HBV SURFACE AG SER QL: NONREACTIVE
HCT VFR BLD CALC: 41 %
HCV AB SER QL: NONREACTIVE
HCV S/CO RATIO: 0.1 S/CO
HGB BLD-MCNC: 13.3 G/DL
HLA-B27 QL NAA+PROBE: NORMAL
IMM GRANULOCYTES NFR BLD AUTO: 0.2 %
KETONES URINE: NEGATIVE MG/DL
LEUKOCYTE ESTERASE URINE: ABNORMAL
LYMPHOCYTES # BLD AUTO: 2.37 K/UL
LYMPHOCYTES NFR BLD AUTO: 36.7 %
MAN DIFF?: NORMAL
MCHC RBC-ENTMCNC: 32 PG
MCHC RBC-ENTMCNC: 32.4 GM/DL
MCV RBC AUTO: 98.6 FL
MONOCYTES # BLD AUTO: 0.57 K/UL
MONOCYTES NFR BLD AUTO: 8.8 %
NEUTROPHILS # BLD AUTO: 3.37 K/UL
NEUTROPHILS NFR BLD AUTO: 52.2 %
NITRITE URINE: NEGATIVE
PH URINE: 7
PLATELET # BLD AUTO: 302 K/UL
POTASSIUM SERPL-SCNC: 4.1 MMOL/L
PROT SERPL-MCNC: 7 G/DL
PROT UR-MCNC: 6 MG/DL
PROTEIN URINE: NEGATIVE MG/DL
RBC # BLD: 4.16 M/UL
RBC # FLD: 14.7 %
RF+CCP IGG SER-IMP: NEGATIVE
RHEUMATOID FACT SER QL: 14 IU/ML
SODIUM SERPL-SCNC: 141 MMOL/L
SPECIFIC GRAVITY URINE: 1.01
TSH SERPL-ACNC: 1.15 UIU/ML
UROBILINOGEN URINE: 1 MG/DL
WBC # FLD AUTO: 6.46 K/UL

## 2024-06-24 RX ORDER — SULFASALAZINE 500 MG/1
500 TABLET ORAL
Qty: 120 | Refills: 1 | Status: ACTIVE | COMMUNITY
Start: 2024-06-24 | End: 1900-01-01

## 2024-09-30 ENCOUNTER — OFFICE (OUTPATIENT)
Facility: LOCATION | Age: 59
Setting detail: OPHTHALMOLOGY
End: 2024-09-30
Payer: COMMERCIAL

## 2024-09-30 DIAGNOSIS — G43.109: ICD-10-CM

## 2024-09-30 DIAGNOSIS — H20.00: ICD-10-CM

## 2024-09-30 DIAGNOSIS — M06.9: ICD-10-CM

## 2024-09-30 PROCEDURE — 92012 INTRM OPH EXAM EST PATIENT: CPT | Performed by: OPHTHALMOLOGY

## 2024-09-30 ASSESSMENT — CONFRONTATIONAL VISUAL FIELD TEST (CVF)
OS_FINDINGS: FULL
OD_FINDINGS: FULL

## 2024-10-01 ENCOUNTER — OFFICE (OUTPATIENT)
Facility: LOCATION | Age: 59
Setting detail: OPHTHALMOLOGY
End: 2024-10-01
Payer: COMMERCIAL

## 2024-10-01 ENCOUNTER — RX ONLY (RX ONLY)
Age: 59
End: 2024-10-01

## 2024-10-01 VITALS — HEIGHT: 68 IN | BODY MASS INDEX: 22.73 KG/M2 | WEIGHT: 150 LBS

## 2024-10-01 DIAGNOSIS — M06.9: ICD-10-CM

## 2024-10-01 DIAGNOSIS — G43.109: ICD-10-CM

## 2024-10-01 DIAGNOSIS — H20.00: ICD-10-CM

## 2024-10-01 DIAGNOSIS — H53.40: ICD-10-CM

## 2024-10-01 PROCEDURE — 92133 CPTRZD OPH DX IMG PST SGM ON: CPT | Performed by: OPHTHALMOLOGY

## 2024-10-01 PROCEDURE — 92083 EXTENDED VISUAL FIELD XM: CPT | Performed by: OPHTHALMOLOGY

## 2024-10-01 PROCEDURE — 99214 OFFICE O/P EST MOD 30 MIN: CPT | Performed by: OPHTHALMOLOGY

## 2024-10-01 ASSESSMENT — CONFRONTATIONAL VISUAL FIELD TEST (CVF)
OS_FINDINGS: FULL
OD_FINDINGS: FULL

## 2024-10-01 ASSESSMENT — KERATOMETRY
OD_K1POWER_DIOPTERS: 45.00
OD_K2POWER_DIOPTERS: 45.50
OS_K2POWER_DIOPTERS: 45.50
OS_AXISANGLE_DEGREES: 159
OD_AXISANGLE_DEGREES: 010
OS_K1POWER_DIOPTERS: 45.00

## 2024-10-02 ENCOUNTER — APPOINTMENT (OUTPATIENT)
Dept: RHEUMATOLOGY | Facility: CLINIC | Age: 59
End: 2024-10-02

## 2024-10-09 ENCOUNTER — OFFICE (OUTPATIENT)
Facility: LOCATION | Age: 59
Setting detail: OPHTHALMOLOGY
End: 2024-10-09
Payer: COMMERCIAL

## 2024-10-09 DIAGNOSIS — H43.812: ICD-10-CM

## 2024-10-09 DIAGNOSIS — G43.109: ICD-10-CM

## 2024-10-09 DIAGNOSIS — H20.00: ICD-10-CM

## 2024-10-09 DIAGNOSIS — H40.013: ICD-10-CM

## 2024-10-09 DIAGNOSIS — H25.13: ICD-10-CM

## 2024-10-09 DIAGNOSIS — M06.9: ICD-10-CM

## 2024-10-09 PROCEDURE — 99213 OFFICE O/P EST LOW 20 MIN: CPT | Performed by: OPHTHALMOLOGY

## 2024-10-09 ASSESSMENT — PACHYMETRY
OS_CT_CORRECTION: 0
OS_CT_UM: 548
OD_CT_UM: 535
OD_CT_CORRECTION: 1

## 2024-10-09 ASSESSMENT — KERATOMETRY
OD_AXISANGLE_DEGREES: 010
OD_K1POWER_DIOPTERS: 45.00
OD_K2POWER_DIOPTERS: 45.50
OS_K1POWER_DIOPTERS: 45.00
OS_AXISANGLE_DEGREES: 159
OS_K2POWER_DIOPTERS: 45.50

## 2024-10-09 ASSESSMENT — REFRACTION_AUTOREFRACTION
OD_AXIS: 100
OD_CYLINDER: -0.75
OS_AXIS: 075
OS_CYLINDER: -0.75
OD_SPHERE: +1.75
OS_SPHERE: +1.50

## 2024-10-09 ASSESSMENT — REFRACTION_CURRENTRX
OS_ADD: +2.50
OS_OVR_VA: 20/
OS_CYLINDER: -0.50
OD_CYLINDER: -0.50
OD_SPHERE: +1.00
OD_AXIS: 085
OS_SPHERE: +0.75
OD_OVR_VA: 20/
OS_AXIS: 075
OD_ADD: +2.50

## 2024-10-09 ASSESSMENT — VISUAL ACUITY
OS_BCVA: 20/20-1
OD_BCVA: 20/20-1

## 2024-10-09 ASSESSMENT — TONOMETRY
OD_IOP_MMHG: 14
OS_IOP_MMHG: 14

## 2024-10-09 ASSESSMENT — CONFRONTATIONAL VISUAL FIELD TEST (CVF)
OD_FINDINGS: FULL
OS_FINDINGS: FULL

## 2024-10-16 PROBLEM — H53.40 VISUAL FIELD DEFECT, UNSPECIFIED: Status: ACTIVE | Noted: 2024-10-01

## 2024-10-16 ASSESSMENT — KERATOMETRY
OD_K1POWER_DIOPTERS: 45.00
OS_K1POWER_DIOPTERS: 45.00
OD_AXISANGLE_DEGREES: 010
OS_AXISANGLE_DEGREES: 159
OS_K2POWER_DIOPTERS: 45.50
OD_K2POWER_DIOPTERS: 45.50

## 2024-10-16 ASSESSMENT — REFRACTION_AUTOREFRACTION
OD_CYLINDER: -0.25
OS_AXIS: 070
OD_SPHERE: +1.00
OD_AXIS: 109
OS_CYLINDER: -0.50
OS_SPHERE: +0.75

## 2024-10-16 ASSESSMENT — VISUAL ACUITY
OD_BCVA: 20/25+1
OS_BCVA: 20/20-2

## 2024-10-17 RX ORDER — ADALIMUMAB 40MG/0.4ML
40 KIT SUBCUTANEOUS
Qty: 2 | Refills: 1 | Status: ACTIVE | COMMUNITY
Start: 2024-10-15

## 2024-11-20 ENCOUNTER — APPOINTMENT (OUTPATIENT)
Dept: RHEUMATOLOGY | Facility: CLINIC | Age: 59
End: 2024-11-20
Payer: COMMERCIAL

## 2024-11-20 VITALS
WEIGHT: 155 LBS | HEART RATE: 63 BPM | DIASTOLIC BLOOD PRESSURE: 79 MMHG | HEIGHT: 68.5 IN | SYSTOLIC BLOOD PRESSURE: 116 MMHG | BODY MASS INDEX: 23.22 KG/M2 | OXYGEN SATURATION: 100 %

## 2024-11-20 DIAGNOSIS — M54.2 CERVICALGIA: ICD-10-CM

## 2024-11-20 PROCEDURE — 99214 OFFICE O/P EST MOD 30 MIN: CPT

## 2024-11-20 PROCEDURE — G2211 COMPLEX E/M VISIT ADD ON: CPT | Mod: NC

## 2024-11-22 DIAGNOSIS — R22.32 LOCALIZED SWELLING, MASS AND LUMP, LEFT UPPER LIMB: ICD-10-CM

## 2024-11-22 LAB
25(OH)D3 SERPL-MCNC: 40.2 NG/ML
ALBUMIN SERPL ELPH-MCNC: 4.4 G/DL
ALP BLD-CCNC: 65 U/L
ALT SERPL-CCNC: 12 U/L
ANION GAP SERPL CALC-SCNC: 12 MMOL/L
AST SERPL-CCNC: 17 U/L
BASOPHILS # BLD AUTO: 0.07 K/UL
BASOPHILS NFR BLD AUTO: 1.3 %
BILIRUB SERPL-MCNC: 0.4 MG/DL
BUN SERPL-MCNC: 14 MG/DL
CALCIUM SERPL-MCNC: 9.9 MG/DL
CHLORIDE SERPL-SCNC: 106 MMOL/L
CO2 SERPL-SCNC: 26 MMOL/L
CREAT SERPL-MCNC: 0.72 MG/DL
CRP SERPL-MCNC: <3 MG/L
EGFR: 97 ML/MIN/1.73M2
EOSINOPHIL # BLD AUTO: 0.09 K/UL
EOSINOPHIL NFR BLD AUTO: 1.7 %
ERYTHROCYTE [SEDIMENTATION RATE] IN BLOOD BY WESTERGREN METHOD: 2 MM/HR
GLUCOSE SERPL-MCNC: 81 MG/DL
HCT VFR BLD CALC: 41.8 %
HGB BLD-MCNC: 13.4 G/DL
IMM GRANULOCYTES NFR BLD AUTO: 0.2 %
LYMPHOCYTES # BLD AUTO: 2.26 K/UL
LYMPHOCYTES NFR BLD AUTO: 42.7 %
MAN DIFF?: NORMAL
MCHC RBC-ENTMCNC: 32.1 G/DL
MCHC RBC-ENTMCNC: 32.3 PG
MCV RBC AUTO: 100.7 FL
MONOCYTES # BLD AUTO: 0.43 K/UL
MONOCYTES NFR BLD AUTO: 8.1 %
NEUTROPHILS # BLD AUTO: 2.43 K/UL
NEUTROPHILS NFR BLD AUTO: 46 %
PLATELET # BLD AUTO: 318 K/UL
POTASSIUM SERPL-SCNC: 4.5 MMOL/L
PROT SERPL-MCNC: 6.7 G/DL
RBC # BLD: 4.15 M/UL
RBC # FLD: 13.6 %
RHEUMATOID FACT SER QL: 12 IU/ML
SODIUM SERPL-SCNC: 143 MMOL/L
WBC # FLD AUTO: 5.29 K/UL

## 2024-11-25 ENCOUNTER — APPOINTMENT (OUTPATIENT)
Dept: ORTHOPEDIC SURGERY | Facility: CLINIC | Age: 59
End: 2024-11-25

## 2024-11-25 VITALS — BODY MASS INDEX: 23.22 KG/M2 | HEIGHT: 68.5 IN | WEIGHT: 155 LBS

## 2024-11-25 DIAGNOSIS — M25.50 PAIN IN UNSPECIFIED JOINT: ICD-10-CM

## 2024-11-25 DIAGNOSIS — M53.3 SACROCOCCYGEAL DISORDERS, NOT ELSEWHERE CLASSIFIED: ICD-10-CM

## 2024-11-25 DIAGNOSIS — L40.50 ARTHROPATHIC PSORIASIS, UNSPECIFIED: ICD-10-CM

## 2024-11-25 PROCEDURE — 99214 OFFICE O/P EST MOD 30 MIN: CPT

## 2024-11-25 PROCEDURE — 72170 X-RAY EXAM OF PELVIS: CPT

## 2024-11-25 PROCEDURE — 72110 X-RAY EXAM L-2 SPINE 4/>VWS: CPT

## 2024-11-25 RX ORDER — IBUPROFEN 600 MG/1
600 TABLET, FILM COATED ORAL EVERY 8 HOURS
Qty: 30 | Refills: 0 | Status: ACTIVE | COMMUNITY
Start: 2024-11-25 | End: 1900-01-01

## 2024-11-25 RX ORDER — NAPROXEN 500 MG/1
500 TABLET ORAL
Qty: 14 | Refills: 0 | Status: ACTIVE | COMMUNITY
Start: 2024-11-22 | End: 1900-01-01

## 2024-12-03 ENCOUNTER — APPOINTMENT (OUTPATIENT)
Dept: ULTRASOUND IMAGING | Facility: CLINIC | Age: 59
End: 2024-12-03
Payer: COMMERCIAL

## 2024-12-03 ENCOUNTER — APPOINTMENT (OUTPATIENT)
Dept: PAIN MANAGEMENT | Facility: CLINIC | Age: 59
End: 2024-12-03
Payer: COMMERCIAL

## 2024-12-03 ENCOUNTER — OUTPATIENT (OUTPATIENT)
Dept: OUTPATIENT SERVICES | Facility: HOSPITAL | Age: 59
LOS: 1 days | End: 2024-12-03
Payer: COMMERCIAL

## 2024-12-03 ENCOUNTER — APPOINTMENT (OUTPATIENT)
Dept: RADIOLOGY | Facility: CLINIC | Age: 59
End: 2024-12-03
Payer: COMMERCIAL

## 2024-12-03 VITALS — BODY MASS INDEX: 23.79 KG/M2 | HEIGHT: 68 IN | WEIGHT: 157 LBS

## 2024-12-03 DIAGNOSIS — M79.10 MYALGIA, UNSPECIFIED SITE: ICD-10-CM

## 2024-12-03 DIAGNOSIS — R22.32 LOCALIZED SWELLING, MASS AND LUMP, LEFT UPPER LIMB: ICD-10-CM

## 2024-12-03 DIAGNOSIS — M54.50 LOW BACK PAIN, UNSPECIFIED: ICD-10-CM

## 2024-12-03 PROCEDURE — 76882 US LMTD JT/FCL EVL NVASC XTR: CPT

## 2024-12-03 PROCEDURE — 72052 X-RAY EXAM NECK SPINE 6/>VWS: CPT | Mod: 26

## 2024-12-03 PROCEDURE — 20552 NJX 1/MLT TRIGGER POINT 1/2: CPT

## 2024-12-03 PROCEDURE — 99204 OFFICE O/P NEW MOD 45 MIN: CPT | Mod: 25

## 2024-12-03 PROCEDURE — 76882 US LMTD JT/FCL EVL NVASC XTR: CPT | Mod: 26,LT

## 2024-12-03 PROCEDURE — J3490M: CUSTOM

## 2024-12-03 PROCEDURE — 72052 X-RAY EXAM NECK SPINE 6/>VWS: CPT

## 2024-12-05 ENCOUNTER — NON-APPOINTMENT (OUTPATIENT)
Age: 59
End: 2024-12-05

## 2024-12-05 DIAGNOSIS — M72.0 PALMAR FASCIAL FIBROMATOSIS [DUPUYTREN]: ICD-10-CM

## 2024-12-10 ENCOUNTER — TRANSCRIPTION ENCOUNTER (OUTPATIENT)
Age: 59
End: 2024-12-10

## 2024-12-13 ENCOUNTER — APPOINTMENT (OUTPATIENT)
Dept: PAIN MANAGEMENT | Facility: CLINIC | Age: 59
End: 2024-12-13

## 2024-12-16 ENCOUNTER — TRANSCRIPTION ENCOUNTER (OUTPATIENT)
Age: 59
End: 2024-12-16

## 2025-02-19 ENCOUNTER — APPOINTMENT (OUTPATIENT)
Dept: RHEUMATOLOGY | Facility: CLINIC | Age: 60
End: 2025-02-19
Payer: COMMERCIAL

## 2025-02-19 VITALS
OXYGEN SATURATION: 100 % | HEART RATE: 61 BPM | HEIGHT: 68 IN | SYSTOLIC BLOOD PRESSURE: 125 MMHG | DIASTOLIC BLOOD PRESSURE: 78 MMHG | WEIGHT: 155 LBS | BODY MASS INDEX: 23.49 KG/M2

## 2025-02-19 DIAGNOSIS — L40.50 ARTHROPATHIC PSORIASIS, UNSPECIFIED: ICD-10-CM

## 2025-02-19 DIAGNOSIS — Z51.81 ENCOUNTER FOR THERAPEUTIC DRUG LVL MONITORING: ICD-10-CM

## 2025-02-19 PROCEDURE — G2211 COMPLEX E/M VISIT ADD ON: CPT | Mod: NC

## 2025-02-19 PROCEDURE — 99214 OFFICE O/P EST MOD 30 MIN: CPT

## 2025-02-20 PROBLEM — Z51.81 MEDICATION MONITORING ENCOUNTER: Status: ACTIVE | Noted: 2025-02-20

## 2025-03-11 ENCOUNTER — TRANSCRIPTION ENCOUNTER (OUTPATIENT)
Age: 60
End: 2025-03-11

## 2025-03-11 DIAGNOSIS — R74.01 ELEVATION OF LEVELS OF LIVER TRANSAMINASE LEVELS: ICD-10-CM

## 2025-03-13 DIAGNOSIS — L40.50 ARTHROPATHIC PSORIASIS, UNSPECIFIED: ICD-10-CM

## 2025-03-26 ENCOUNTER — APPOINTMENT (OUTPATIENT)
Dept: RHEUMATOLOGY | Facility: CLINIC | Age: 60
End: 2025-03-26

## 2025-03-26 VITALS
WEIGHT: 153 LBS | HEIGHT: 68 IN | HEART RATE: 66 BPM | OXYGEN SATURATION: 100 % | BODY MASS INDEX: 23.19 KG/M2 | SYSTOLIC BLOOD PRESSURE: 108 MMHG | DIASTOLIC BLOOD PRESSURE: 70 MMHG

## 2025-03-26 PROCEDURE — 99214 OFFICE O/P EST MOD 30 MIN: CPT

## 2025-03-26 PROCEDURE — G2211 COMPLEX E/M VISIT ADD ON: CPT | Mod: NC

## 2025-03-26 RX ORDER — ATOGEPANT 10 MG/1
10 TABLET ORAL
Qty: 1 | Refills: 0 | Status: ACTIVE | COMMUNITY
Start: 2025-03-26

## 2025-03-26 RX ORDER — SERTRALINE HYDROCHLORIDE 50 MG/1
50 TABLET, FILM COATED ORAL DAILY
Refills: 0 | Status: ACTIVE | COMMUNITY
Start: 2025-03-26

## 2025-03-31 ENCOUNTER — NON-APPOINTMENT (OUTPATIENT)
Age: 60
End: 2025-03-31

## 2025-03-31 DIAGNOSIS — R74.01 ELEVATION OF LEVELS OF LIVER TRANSAMINASE LEVELS: ICD-10-CM

## 2025-04-01 ENCOUNTER — APPOINTMENT (OUTPATIENT)
Dept: RHEUMATOLOGY | Facility: CLINIC | Age: 60
End: 2025-04-01

## 2025-04-14 ENCOUNTER — TRANSCRIPTION ENCOUNTER (OUTPATIENT)
Age: 60
End: 2025-04-14

## 2025-04-15 ENCOUNTER — TRANSCRIPTION ENCOUNTER (OUTPATIENT)
Age: 60
End: 2025-04-15

## 2025-04-15 DIAGNOSIS — R06.02 SHORTNESS OF BREATH: ICD-10-CM

## 2025-04-16 ENCOUNTER — TRANSCRIPTION ENCOUNTER (OUTPATIENT)
Age: 60
End: 2025-04-16

## 2025-04-18 ENCOUNTER — TRANSCRIPTION ENCOUNTER (OUTPATIENT)
Age: 60
End: 2025-04-18

## 2025-04-28 ENCOUNTER — APPOINTMENT (OUTPATIENT)
Dept: RHEUMATOLOGY | Facility: CLINIC | Age: 60
End: 2025-04-28
Payer: COMMERCIAL

## 2025-04-28 VITALS
DIASTOLIC BLOOD PRESSURE: 68 MMHG | TEMPERATURE: 97.6 F | SYSTOLIC BLOOD PRESSURE: 110 MMHG | BODY MASS INDEX: 22.85 KG/M2 | WEIGHT: 150.8 LBS | OXYGEN SATURATION: 99 % | HEART RATE: 69 BPM | HEIGHT: 68 IN

## 2025-04-28 DIAGNOSIS — M25.50 PAIN IN UNSPECIFIED JOINT: ICD-10-CM

## 2025-04-28 PROCEDURE — G2211 COMPLEX E/M VISIT ADD ON: CPT | Mod: NC

## 2025-04-28 PROCEDURE — 99215 OFFICE O/P EST HI 40 MIN: CPT

## 2025-04-28 RX ORDER — PREDNISONE 10 MG/1
10 TABLET ORAL
Qty: 21 | Refills: 1 | Status: ACTIVE | COMMUNITY
Start: 2025-04-28 | End: 1900-01-01

## 2025-04-29 RX ORDER — APREMILAST 30 MG/1
30 TABLET, FILM COATED ORAL
Qty: 60 | Refills: 2 | Status: ACTIVE | COMMUNITY
Start: 2025-04-28 | End: 1900-01-01

## 2025-04-30 RX ORDER — APREMILAST 10-20-30MG
10 & 20 & 30 KIT ORAL
Qty: 1 | Refills: 0 | Status: ACTIVE | COMMUNITY
Start: 2025-04-28

## 2025-05-05 DIAGNOSIS — L40.50 ARTHROPATHIC PSORIASIS, UNSPECIFIED: ICD-10-CM

## 2025-05-05 RX ORDER — IBUPROFEN 600 MG/1
600 TABLET, FILM COATED ORAL 3 TIMES DAILY
Qty: 42 | Refills: 0 | Status: ACTIVE | COMMUNITY
Start: 2025-05-05 | End: 1900-01-01

## 2025-05-09 ENCOUNTER — APPOINTMENT (OUTPATIENT)
Dept: ULTRASOUND IMAGING | Facility: CLINIC | Age: 60
End: 2025-05-09

## 2025-05-09 ENCOUNTER — RESULT REVIEW (OUTPATIENT)
Age: 60
End: 2025-05-09

## 2025-05-09 PROCEDURE — 76882 US LMTD JT/FCL EVL NVASC XTR: CPT | Mod: 59,LT

## 2025-05-09 PROCEDURE — 76881 US COMPL JOINT R-T W/IMG: CPT | Mod: LT

## 2025-05-21 ENCOUNTER — APPOINTMENT (OUTPATIENT)
Dept: RHEUMATOLOGY | Facility: CLINIC | Age: 60
End: 2025-05-21

## 2025-07-18 ENCOUNTER — TRANSCRIPTION ENCOUNTER (OUTPATIENT)
Age: 60
End: 2025-07-18

## 2025-07-23 ENCOUNTER — APPOINTMENT (OUTPATIENT)
Dept: RHEUMATOLOGY | Facility: CLINIC | Age: 60
End: 2025-07-23

## 2025-07-29 ENCOUNTER — APPOINTMENT (OUTPATIENT)
Dept: RHEUMATOLOGY | Facility: CLINIC | Age: 60
End: 2025-07-29